# Patient Record
Sex: FEMALE | Employment: OTHER | URBAN - METROPOLITAN AREA
[De-identification: names, ages, dates, MRNs, and addresses within clinical notes are randomized per-mention and may not be internally consistent; named-entity substitution may affect disease eponyms.]

---

## 2018-03-06 ENCOUNTER — TELEPHONE (OUTPATIENT)
Dept: NEUROLOGY | Facility: CLINIC | Age: 78
End: 2018-03-06

## 2018-03-06 NOTE — TELEPHONE ENCOUNTER
----- Message from Flaco Abraham sent at 3/6/2018  4:05 PM CST -----  Contact: Patient @ [phone]  Patient is calling to schedule a NP appt to consult on Tremors, pls call

## 2018-03-08 ENCOUNTER — TELEPHONE (OUTPATIENT)
Dept: NEUROLOGY | Facility: CLINIC | Age: 78
End: 2018-03-08

## 2018-03-09 ENCOUNTER — TELEPHONE (OUTPATIENT)
Dept: NEUROLOGY | Facility: CLINIC | Age: 78
End: 2018-03-09

## 2018-03-09 NOTE — TELEPHONE ENCOUNTER
----- Message from Rupal Reyna sent at 3/9/2018  8:15 AM CST -----  Contact: pt @ [phone]  Patient is returning a call from Michael to schedule an earlier appt than the 6/19 appt that she has, per her ENT doctor she needs too be seen sooner. Please call.

## 2018-03-09 NOTE — TELEPHONE ENCOUNTER
----- Message from Rupal Reyna sent at 3/7/2018  9:25 AM CST -----  Contact: pt  @ [phone]  Calling to schedule an appt to see Dr. Jackson, was told by her ENT doctor that she should see a Neurologist for head tremors. The earliest appt in the system is June, patient would like to see Dr. Jackson sooner. Please call.

## 2018-05-11 ENCOUNTER — TELEPHONE (OUTPATIENT)
Dept: NEUROLOGY | Facility: CLINIC | Age: 78
End: 2018-05-11

## 2018-05-11 NOTE — TELEPHONE ENCOUNTER
----- Message from Flaco Abraham sent at 5/11/2018  3:46 PM CDT -----  Contact: Patient @ [phone]  Caller is returning a missed about r/s'ing the 5-14th appt, pls call

## 2018-07-02 ENCOUNTER — OFFICE VISIT (OUTPATIENT)
Dept: NEUROLOGY | Facility: CLINIC | Age: 78
End: 2018-07-02
Payer: MEDICARE

## 2018-07-02 VITALS
WEIGHT: 131.38 LBS | SYSTOLIC BLOOD PRESSURE: 163 MMHG | BODY MASS INDEX: 24.18 KG/M2 | HEIGHT: 62 IN | DIASTOLIC BLOOD PRESSURE: 80 MMHG | HEART RATE: 60 BPM

## 2018-07-02 DIAGNOSIS — G25.0 ESSENTIAL TREMOR: Primary | ICD-10-CM

## 2018-07-02 PROCEDURE — 99204 OFFICE O/P NEW MOD 45 MIN: CPT | Mod: S$GLB,,, | Performed by: PSYCHIATRY & NEUROLOGY

## 2018-07-02 PROCEDURE — 99999 PR PBB SHADOW E&M-EST. PATIENT-LVL III: CPT | Mod: PBBFAC,,, | Performed by: PSYCHIATRY & NEUROLOGY

## 2018-07-02 NOTE — PROGRESS NOTES
"Name: Krista Manriquez  MRN: [id number]   CSN: [id number]      Date: 2018    Referring physician:  Aaarefmaisha Self  No address on file    Chief Complaint / Interval History: No chief complaint on file.      History of Present Illness (HPI):  77 yo referred here from ENT, has noticed some moving of her head with tremors at night.  She also has had voice tremors that has gotten a bit more gravelly, maybe for the last year.  She is unsure of how long.  Dr. Moore (ENT) saw some "laryngitis" but didn't tell her about any vocal cord movements.  Says no other doctors have mentioned anything to her.  Her  has not noticed her voice or her head either, but he is not here today.    Doesn't drink, so no clear response.  No FH in parents (passed) or her siblings.  Has four kids, 7 grands, 8 great-grands.  .  Now remarried for 30 years.  ONe son  in an automobile accident, and other  with cancer.      Grew up in [place] [facility] [place] [facility], 11th grade, worked a little outside the house    Nonmotor/Premotor ROS:  Hyposmia (HENT)?No  RBD/sleep issues (Constitutional)?No  Depression/anxiety (Psychiatric)?Yes  Fatigue (Constitutional)?No  Constipation (GI)?No  Urinary issues ()?No  Sexual dysfunction ()?No  Orthostasis (Cardiovascular)?No  Leg swelling (Cardiovascular)? No  Falls (Musculoskeletal)?No  Cognitive impairment (Neurologic)?No  Psychoses (Psychiatric)?No  Pain/Paresthesia (Neurologic)?No  Visual changes (Eyes)?No  Moles / skin changes (Skin)?No  Stridor / SOB (Pulm)?No  Bruising (Heme)?No    Past Medical History: The patient  has a past medical history of Hypercholesteremia and Hypertension.    Social History: The patient  reports that she has never smoked. She does not have any smokeless tobacco history on file.    Family History: Their family history is not on file.    Allergies: Adhesive and Iodine and iodide containing products     Meds:   Current Outpatient Prescriptions on File Prior to Visit " "  Medication Sig Dispense Refill    alprazolam (XANAX) 0.25 MG tablet Take 0.25 mg by mouth once daily.  0    atenolol (TENORMIN) 50 MG tablet       gabapentin (NEURONTIN) 300 MG capsule       levothyroxine (SYNTHROID) 25 MCG tablet       losartan (COZAAR) 50 MG tablet       lovastatin (MEVACOR) 40 MG tablet Take 40 mg by mouth nightly. Half tablet      meloxicam (MOBIC) 7.5 MG tablet       omeprazole (PRILOSEC) 20 MG capsule       trimethoprim (TRIMPEX) 100 mg Tab Take 100 mg by mouth every evening.      zolpidem (AMBIEN) 10 mg Tab        No current facility-administered medications on file prior to visit.        Exam:  BP (!) 163/80   Pulse 60   Ht 5' 2" (1.575 m)   Wt 59.6 kg (131 lb 6.3 oz)   BMI 24.03 kg/m²     Constitutional  Well-developed, well-nourished, appears stated age   Ophthalmoscopic  No papilledema with no hemorrhages or exudates bilaterally   Cardiovascular  Radial pulses 2+ and symmetric, no LE edema bilaterally   Neurological    * Mental status  MOCA =      - Orientation  Oriented to person, place, time, and situation     - Memory   Intact recent and remote     - Attention/concentration  Attentive, vigilant during exam     - Language  Naming & repetition intact, +2-step commands     - Fund of knowledge  Aware of current events     - Executive  Well-organized thoughts     - Other     * Cranial nerves       - CN II  PERRL, visual fields full to confrontation     - CN III, IV, VI  Extraocular movements full, normal pursuits and saccades     - CN V  Sensation V1 - V3 intact     - CN VII  Face strong and symmetric bilaterally     - CN VIII  Hearing intact bilaterally     - CN IX, X  Palate raises midline and symmetric     - CN XI  SCM and trapezius 5/5 bilaterally     - CN XII  Tongue midline   * Motor  Muscle bulk normal, strength 5/5 throughout   * Sensory   Intact to temperature and vibration throughout   * Coordination  No dysmetria with finger-to-nose or heel-to-shin   * Gait  See " below.   * Deep tendon reflexes  2+ and symmetric throughout   Babinski downgoing bilaterally   * Specialized movement exam  No hypophonic speech.      High freq low amp tremor of neck (no-no), mild arms and legs. A little voice, no spasmodic change.        Laboratory/Radiological:  - Results:  No visits with results within 3 Month(s) from this visit.   Latest known visit with results is:   Office Visit on 11/28/2017   Component Date Value Ref Range Status    SOURCE: 11/28/2017 Cervical   Final    Slides: 11/28/2017 2   Final    LMP: 11/28/2017 NOT GIVEN   Final    Specimen adequacy: 11/28/2017 (NOTE)   Final    Interpretation 11/28/2017 (NOTE)   Final    Comments: 11/28/2017 (NOTE)   Final    Cytotechnologist: 11/28/2017 LORETTA REAL(ASCP)   Final    QC reviewed by: 11/28/2017 HUBER Sommers(ASCP),IAC   Final    LOCATION 11/28/2017 (NOTE)   Final    CPT Codes: 11/28/2017 (NOTE)   Final    HPV HIGH RISK IF ASC/LSIL, THINPREP 11/28/2017 CRITERIA NOT MET   Final    Blood Stool 11/28/2017 Negative  Negative Final     Acceptable 11/28/2017 Yes   Final    BV RESULT 11/28/2017 INTERMEDIATE*  Final    BV KATRINA Interpretation 11/28/2017 SEE BELOW   Final    Lactobacillus Log 11/28/2017 <3.00  log CFU/mL Final    LACTOBACILLUS INTERP 11/28/2017 Not Detected   Final    Gardenerella Vaginalis LOG 11/28/2017 3.08  log CFU/mL Final    GARDNERELLA INTERP 11/28/2017 Detected   Final    ATOBOBIUM VAGINAE LOG 11/28/2017 <3.00  log CFU/mL Final    ATOPOBIUM INTERP 11/28/2017 Not Detected   Final    MEGASPHAERA LOG 11/28/2017 3.56  log CFU/mL Final    MEGASPHAERA INTERP 11/28/2017 Detected   Final    Candida albicans, KATRINA 11/28/2017 NEGATIVE   Final    Rosalinda glabrata, KATRINA 11/28/2017 NEGATIVE   Final    Candida Parapsilosis 11/28/2017 NEGATIVE   Final    Candida Tropicalis 11/28/2017 NEGATIVE   Final    Trichomonas, SimpleSwab 11/28/2017 NEGATIVE   Final       - Independent review of  images:    Diagnoses:          Probable essential tremor - would have expected dystonic, but no other convincing features.     Medical Decision Making:    - risks and benefits of meds  - already on atenolol  - already on gabapentin  - already on a benzo  - discussed a future med if interested  - importance of diet and exercise    Aroldo Jackson MD, MPH  Division of Movement and Memory Disorders  [facility]  [phone]

## 2018-07-02 NOTE — PATIENT INSTRUCTIONS
""Essential tremor" - affects the voice, neck, hands - very mild with high frequency.  Not causing disability, but you can consider a medication in the future if you would like it.    Come back and see me in 3 months.          Aroldo Jackson MD, MPH  Division of Movement and Memory Disorders  [facility]      "

## 2018-10-12 ENCOUNTER — OFFICE VISIT (OUTPATIENT)
Dept: NEUROLOGY | Facility: CLINIC | Age: 78
End: 2018-10-12
Payer: MEDICARE

## 2018-10-12 VITALS
SYSTOLIC BLOOD PRESSURE: 117 MMHG | HEIGHT: 62 IN | DIASTOLIC BLOOD PRESSURE: 65 MMHG | WEIGHT: 134.69 LBS | BODY MASS INDEX: 24.78 KG/M2 | HEART RATE: 66 BPM

## 2018-10-12 DIAGNOSIS — G25.0 ESSENTIAL TREMOR: Primary | ICD-10-CM

## 2018-10-12 PROCEDURE — 99213 OFFICE O/P EST LOW 20 MIN: CPT | Mod: PBBFAC | Performed by: PSYCHIATRY & NEUROLOGY

## 2018-10-12 PROCEDURE — 99213 OFFICE O/P EST LOW 20 MIN: CPT | Mod: S$PBB,,, | Performed by: PSYCHIATRY & NEUROLOGY

## 2018-10-12 PROCEDURE — 99999 PR PBB SHADOW E&M-EST. PATIENT-LVL III: CPT | Mod: PBBFAC,,, | Performed by: PSYCHIATRY & NEUROLOGY

## 2018-10-12 NOTE — PROGRESS NOTES
"Name: Krista Manriquez  MRN: [id number]   CSN: [id number]      Date: 10/12/2018    Referring physician:  Hiram Teran MD  [address]  [facility] [address]  [place] [address]    Chief Complaint / Interval History:   - might be a little more shaking since last time  - voice is a little worse  - only notices a bit at night  - not too keen on taking more meds  - balance is good  - introduced dbs      History of Present Illness (HPI):  77 yo referred here from ENT, has noticed some moving of her head with tremors at night.  She also has had voice tremors that has gotten a bit more gravelly, maybe for the last year.  She is unsure of how long.  Dr. Moore (ENT) saw some "laryngitis" but didn't tell her about any vocal cord movements.  Says no other doctors have mentioned anything to her.  Her  has not noticed her voice or her head either, but he is not here today.    Doesn't drink, so no clear response.  No FH in parents (passed) or her siblings.  Has four kids, 7 grands, 8 great-grands.  .  Now remarried for 30 years.  ONe son  in an automobile accident, and other  with cancer.      Grew up in [place], 11th grade, worked a little outside the house    Nonmotor/Premotor ROS:  Hyposmia (HENT)?No  RBD/sleep issues (Constitutional)?No  Depression/anxiety (Psychiatric)?Yes  Fatigue (Constitutional)?No  Constipation (GI)?No  Urinary issues ()?No  Sexual dysfunction ()?No  Orthostasis (Cardiovascular)?No  Leg swelling (Cardiovascular)? No  Falls (Musculoskeletal)?No  Cognitive impairment (Neurologic)?No  Psychoses (Psychiatric)?No  Pain/Paresthesia (Neurologic)?No  Visual changes (Eyes)?No  Moles / skin changes (Skin)?No  Stridor / SOB (Pulm)?No  Bruising (Heme)?No    Past Medical History: The patient  has a past medical history of Hypercholesteremia and Hypertension.    Social History: The patient  reports that  has never smoked. She does not have any smokeless tobacco history on file.    Family " "History: Their family history is not on file.    Allergies: Adhesive and Iodine and iodide containing products     Meds:   Current Outpatient Medications on File Prior to Visit   Medication Sig Dispense Refill    alprazolam (XANAX) 0.25 MG tablet Take 0.25 mg by mouth once daily.  0    atenolol (TENORMIN) 50 MG tablet       gabapentin (NEURONTIN) 300 MG capsule       levothyroxine (SYNTHROID) 25 MCG tablet       losartan (COZAAR) 50 MG tablet       lovastatin (MEVACOR) 40 MG tablet Take 40 mg by mouth nightly. Half tablet      omeprazole (PRILOSEC) 20 MG capsule       trimethoprim (TRIMPEX) 100 mg Tab Take 100 mg by mouth every evening.      zolpidem (AMBIEN) 10 mg Tab       [DISCONTINUED] meloxicam (MOBIC) 7.5 MG tablet        No current facility-administered medications on file prior to visit.        Exam:  /65   Pulse 66   Ht 5' 2" (1.575 m)   Wt 61.1 kg (134 lb 11.2 oz)   BMI 24.64 kg/m²     * Specialized movement exam  No hypophonic speech.      High freq low amp tremor of neck (no-no), mild arms and legs. L>R  Prominent voice tremor, no spasmodic change.        Laboratory/Radiological:  - Results:  No visits with results within 3 Month(s) from this visit.   Latest known visit with results is:   Office Visit on 11/28/2017   Component Date Value Ref Range Status    SOURCE: 11/28/2017 Cervical   Final    Slides: 11/28/2017 2   Final    LMP: 11/28/2017 NOT GIVEN   Final    Specimen adequacy: 11/28/2017 (NOTE)   Final    Interpretation 11/28/2017 (NOTE)   Final    Comments: 11/28/2017 (NOTE)   Final    Cytotechnologist: 11/28/2017 DARIO SKINNER,CT(ASCP)   Final    QC reviewed by: 11/28/2017 Marquis Marie,SCT(ASCP),IAC   Final    LOCATION 11/28/2017 (NOTE)   Final    CPT Codes: 11/28/2017 (NOTE)   Final    HPV HIGH RISK IF ASC/LSIL, THINPREP 11/28/2017 CRITERIA NOT MET   Final    Blood Stool 11/28/2017 Negative  Negative Final     Acceptable 11/28/2017 Yes   Final    " BV RESULT 2017 INTERMEDIATE*  Final    BV KATRINA Interpretation 2017 SEE BELOW   Final    Lactobacillus Log 2017 <3.00  log CFU/mL Final    LACTOBACILLUS INTERP 2017 Not Detected   Final    Gardenerella Vaginalis LOG 2017 3.08  log CFU/mL Final    GARDNERELLA INTERP 2017 Detected   Final    ATOBOBIUM VAGINAE LOG 2017 <3.00  log CFU/mL Final    ATOPOBIUM INTERP 2017 Not Detected   Final    MEGASPHAERA LOG 2017 3.56  log CFU/mL Final    MEGASPHAERA INTERP 2017 Detected   Final    Candida albicans, KATRINA 2017 NEGATIVE   Final    Rosalinda glabrata, KATRINA 2017 NEGATIVE   Final    Candida Parapsilosis 2017 NEGATIVE   Final    Candida Tropicalis 2017 NEGATIVE   Final    Trichomonas, SimpleSwab 2017 NEGATIVE   Final       - Independent review of images:    Diagnoses:          Probable essential tremor - would have expected dystonic, but no other convincing features.  A bit worse from previous.    Medical Decision Makin) risks and benefits of meds discussed and she declines again.  As a review:    - already on atenolol    - already on gabapentin    - already on a benzo    2) DBS consideration?  She is not interested.    3) importance of diet and exercise    Aroldo Jackson MD, MPH  Division of Movement and Memory Disorders  [facility]  [phone]

## 2018-10-12 NOTE — LETTER
October 12, 2018      Hiram Teran MD  [address]  [address]  [place] [address]           [facility]  [address]  [place] [address]  Phone: [phone]  Fax: [phone]          Patient: Krista Manriquez   MR Number: [id number]   YOB: 1940   Date of Visit: 10/12/2018       Dear Dr. Hiram Teran:    Thank you for referring Krista Manriquez to me for evaluation. Attached you will find relevant portions of my assessment and plan of care.    If you have questions, please do not hesitate to call me. I look forward to following Krista Manriquez along with you.    Sincerely,    Aroldo Jackson MD    Enclosure  CC:  No Recipients    If you would like to receive this communication electronically, please contact [email or web address] or [phone] to request more information on [email or web address] Link access.    For providers and/or their staff who would like to refer a patient to [facility], please contact us through our one-stop-shop provider referral line, [facility], [place], at [phone].    If you feel you have received this communication in error or would no longer like to receive these types of communications, please e-mail [email or web address]

## 2018-11-28 PROBLEM — G25.0 TREMOR, ESSENTIAL: Status: ACTIVE | Noted: 2018-11-28

## 2019-09-07 ENCOUNTER — LAB VISIT (OUTPATIENT)
Dept: LAB | Facility: HOSPITAL | Age: 79
End: 2019-09-07
Attending: INTERNAL MEDICINE
Payer: MEDICARE

## 2019-09-07 DIAGNOSIS — Z12.11 SCREENING FOR COLON CANCER: ICD-10-CM

## 2019-09-07 PROCEDURE — 82274 ASSAY TEST FOR BLOOD FECAL: CPT

## 2019-09-17 DIAGNOSIS — Z12.11 SCREENING FOR COLON CANCER: Primary | ICD-10-CM

## 2019-09-18 LAB — HEMOCCULT STL QL IA: NEGATIVE

## 2019-11-11 PROBLEM — I10 HYPERTENSION, ESSENTIAL: Status: ACTIVE | Noted: 2019-11-11

## 2019-11-11 PROBLEM — J06.9 UPPER RESPIRATORY TRACT INFECTION: Status: ACTIVE | Noted: 2019-11-11

## 2019-11-18 PROBLEM — J20.9 ACUTE BRONCHITIS: Status: ACTIVE | Noted: 2019-11-18

## 2020-01-03 PROBLEM — E03.9 HYPOTHYROIDISM: Status: ACTIVE | Noted: 2020-01-03

## 2020-01-03 PROBLEM — E78.5 HYPERLIPIDEMIA: Status: ACTIVE | Noted: 2020-01-03

## 2020-01-03 PROBLEM — Z00.00 ROUTINE GENERAL MEDICAL EXAMINATION AT A HEALTH CARE FACILITY: Status: ACTIVE | Noted: 2020-01-03

## 2020-04-06 PROBLEM — Z00.00 ROUTINE GENERAL MEDICAL EXAMINATION AT A HEALTH CARE FACILITY: Status: RESOLVED | Noted: 2020-01-03 | Resolved: 2020-04-06

## 2020-06-11 PROBLEM — R73.03 PREDIABETES: Status: ACTIVE | Noted: 2020-06-11

## 2021-01-06 PROBLEM — J02.9 SORE THROAT: Status: ACTIVE | Noted: 2021-01-06

## 2021-01-25 PROBLEM — B34.2 CORONAVIRUS INFECTION, UNSPECIFIED: Status: ACTIVE | Noted: 2021-01-25

## 2021-04-08 ENCOUNTER — IMMUNIZATION (OUTPATIENT)
Dept: OBSTETRICS AND GYNECOLOGY | Facility: CLINIC | Age: 81
End: 2021-04-08
Payer: MEDICARE

## 2021-04-08 DIAGNOSIS — Z23 NEED FOR VACCINATION: Primary | ICD-10-CM

## 2021-04-08 PROCEDURE — 91300 COVID-19, MRNA, LNP-S, PF, 30 MCG/0.3 ML DOSE VACCINE: CPT | Mod: PBBFAC | Performed by: FAMILY MEDICINE

## 2021-04-29 ENCOUNTER — IMMUNIZATION (OUTPATIENT)
Dept: OBSTETRICS AND GYNECOLOGY | Facility: CLINIC | Age: 81
End: 2021-04-29
Payer: OTHER GOVERNMENT

## 2021-04-29 DIAGNOSIS — Z23 NEED FOR VACCINATION: Primary | ICD-10-CM

## 2021-04-29 PROCEDURE — 91300 COVID-19, MRNA, LNP-S, PF, 30 MCG/0.3 ML DOSE VACCINE: CPT | Mod: PBBFAC

## 2021-04-29 PROCEDURE — 0002A COVID-19, MRNA, LNP-S, PF, 30 MCG/0.3 ML DOSE VACCINE: CPT | Mod: PBBFAC
